# Patient Record
Sex: FEMALE | Race: WHITE | NOT HISPANIC OR LATINO | Employment: FULL TIME | URBAN - METROPOLITAN AREA
[De-identification: names, ages, dates, MRNs, and addresses within clinical notes are randomized per-mention and may not be internally consistent; named-entity substitution may affect disease eponyms.]

---

## 2019-12-11 ENCOUNTER — OFFICE VISIT (OUTPATIENT)
Dept: PSYCHIATRY | Facility: CLINIC | Age: 59
End: 2019-12-11

## 2019-12-11 DIAGNOSIS — F31.31 BIPOLAR AFFECTIVE DISORDER, CURRENTLY DEPRESSED, MILD (HCC): Primary | ICD-10-CM

## 2019-12-11 DIAGNOSIS — F51.05 INSOMNIA DUE TO MENTAL DISORDER: ICD-10-CM

## 2019-12-11 PROBLEM — F31.30 BIPOLAR AFFECTIVE DISORDER, DEPRESSED (HCC): Status: ACTIVE | Noted: 2017-08-03

## 2019-12-11 PROCEDURE — 99213 OFFICE O/P EST LOW 20 MIN: CPT | Performed by: PHYSICIAN ASSISTANT

## 2019-12-11 RX ORDER — SERTRALINE HYDROCHLORIDE 100 MG/1
TABLET, FILM COATED ORAL
Qty: 180 TABLET | Refills: 3 | Status: SHIPPED | OUTPATIENT
Start: 2019-12-11 | End: 2020-12-31 | Stop reason: SDUPTHER

## 2019-12-11 RX ORDER — TRAZODONE HYDROCHLORIDE 150 MG/1
TABLET ORAL EVERY 24 HOURS
COMMUNITY
Start: 2016-12-19 | End: 2019-12-11 | Stop reason: SDUPTHER

## 2019-12-11 RX ORDER — TRAZODONE HYDROCHLORIDE 150 MG/1
150 TABLET ORAL NIGHTLY
Qty: 90 TABLET | Refills: 3 | Status: SHIPPED | OUTPATIENT
Start: 2019-12-11 | End: 2020-12-31 | Stop reason: SDUPTHER

## 2019-12-11 RX ORDER — LEVOTHYROXINE SODIUM 0.05 MG/1
50 TABLET ORAL DAILY
COMMUNITY

## 2019-12-11 RX ORDER — SERTRALINE HYDROCHLORIDE 100 MG/1
TABLET, FILM COATED ORAL
COMMUNITY
Start: 2015-08-12 | End: 2019-12-11 | Stop reason: SDUPTHER
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
## 2020-12-31 ENCOUNTER — OFFICE VISIT (OUTPATIENT)
Dept: PSYCHIATRY | Facility: CLINIC | Age: 60
End: 2020-12-31

## 2020-12-31 DIAGNOSIS — F51.05 INSOMNIA DUE TO MENTAL DISORDER: ICD-10-CM

## 2020-12-31 DIAGNOSIS — F41.9 ANXIETY: ICD-10-CM

## 2020-12-31 DIAGNOSIS — F31.31 BIPOLAR AFFECTIVE DISORDER, CURRENTLY DEPRESSED, MILD (HCC): Primary | ICD-10-CM

## 2020-12-31 PROCEDURE — 99213 OFFICE O/P EST LOW 20 MIN: CPT | Performed by: PHYSICIAN ASSISTANT

## 2020-12-31 RX ORDER — SERTRALINE HYDROCHLORIDE 100 MG/1
TABLET, FILM COATED ORAL
Qty: 180 TABLET | Refills: 3 | Status: SHIPPED | OUTPATIENT
Start: 2020-12-31 | End: 2021-12-16 | Stop reason: SDUPTHER

## 2020-12-31 RX ORDER — TRAZODONE HYDROCHLORIDE 150 MG/1
150 TABLET ORAL NIGHTLY
Qty: 90 TABLET | Refills: 3 | Status: SHIPPED | OUTPATIENT
Start: 2020-12-31 | End: 2021-12-16 | Stop reason: SDUPTHER

## 2020-12-31 NOTE — PATIENT INSTRUCTIONS

## 2020-12-31 NOTE — PROGRESS NOTES
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    PAST PSYCHIATRIC HISTORY  Axis I  Affective/Bipoloar Disorder, Anxiety/Panic Disorder  Axis II  None    PAST OUTPATIENT TREATMENT  Diagnosis treated:  Affective Disorder, Anxiety/Panic Disorder  Treatment Type:  Medication Management  Prior Psychiatric Medications:  Lexapro  Zoloft  Trazodone  Support Groups:  None  Sequelae Of Mental Disorder:  job disruption, social isolation, emotional distress      Interval History  No Change    Side Effects  None    Past Psych Hx was reviewed and compared to 12/11/19 and appropriate updates were made.    Past Medical History:  Past Medical History:   Diagnosis Date   • Anxiety    • Bipolar disorder (CMS/HCC)    • Depression    • Disease of thyroid gland        Social History:  Social History     Socioeconomic History   • Marital status:      Spouse name: Not on file   • Number of children: Not on file   • Years of education: Not on file   • Highest education level: Not on file   Tobacco Use   • Smoking status: Never Smoker   • Smokeless tobacco: Never Used   Substance and Sexual  Activity   • Alcohol use: Yes     Alcohol/week: 1.0 standard drinks     Types: 1 Glasses of wine per week   • Drug use: Never   • Sexual activity: Yes     Partners: Male       Family History:  Family History   Problem Relation Age of Onset   • Anxiety disorder Mother        Past Surgical History:  Past Surgical History:   Procedure Laterality Date   • CHOLECYSTECTOMY OPEN         Problem List:  Patient Active Problem List   Diagnosis   • Bipolar affective disorder, depressed (CMS/HCC)   • Insomnia due to mental disorder       Allergy:   Allergies   Allergen Reactions   • Sulfa Antibiotics Other (See Comments)        Discontinued Medications:  Medications Discontinued During This Encounter   Medication Reason   • sertraline (ZOLOFT) 100 MG tablet Reorder   • traZODone (DESYREL) 150 MG tablet Reorder       Current Medications:   Current Outpatient Medications   Medication Sig Dispense Refill   • levothyroxine (SYNTHROID, LEVOTHROID) 50 MCG tablet Take 50 mcg by mouth Daily.     • sertraline (Zoloft) 100 MG tablet Take two tablets at bedime 180 tablet 3   • traZODone (DESYREL) 150 MG tablet Take 1 tablet by mouth Every Night. 90 tablet 3     No current facility-administered medications for this visit.          Review of Symptoms:    Psychiatric/Behavioral: Negative for agitation, behavioral problems, confusion, decreased concentration, dysphoric mood, hallucinations, self-injury, sleep disturbance and suicidal ideas. The patient is not nervous/anxious and is not hyperactive.        Physical Exam:   There were no vitals taken for this visit.    Mental Status Exam:   Hygiene:   good  Cooperation:  Cooperative  Eye Contact:  Good  Psychomotor Behavior:  Appropriate  Affect:  Appropriate  Mood: Normal  Hopelessness: Denies  Speech:  Normal  Thought Process:  Goal directed  Thought Content:  Normal  Suicidal:  None  Homicidal:  None  Hallucinations:  None  Delusion:  None  Memory:  Intact  Orientation:  Person, Place, Time  and Situation  Reliability:  good  Insight:  Good  Judgement:  Good  Impulse Control:  Good  Physical/Medical Issues:  No      Mental Status Exam was reviewed and compared to 12/11/19 visit appropriate updates were made.    PHQ-9 Depression Screening  Little interest or pleasure in doing things? 0   Feeling down, depressed, or hopeless? 1   Trouble falling or staying asleep, or sleeping too much?     Feeling tired or having little energy?     Poor appetite or overeating?     Feeling bad about yourself - or that you are a failure or have let yourself or your family down?     Trouble concentrating on things, such as reading the newspaper or watching television?     Moving or speaking so slowly that other people could have noticed? Or the opposite - being so fidgety or restless that you have been moving around a lot more than usual?     Thoughts that you would be better off dead, or of hurting yourself in some way?     PHQ-9 Total Score 1   If you checked off any problems, how difficult have these problems made it for you to do your work, take care of things at home, or get along with other people?             Never smoker    I advised Merced of the risks of tobacco use.     Lab Results:   No visits with results within 3 Month(s) from this visit.   Latest known visit with results is:   No results found for any previous visit.       Assessment/Plan   Problems Addressed this Visit        Other    Bipolar affective disorder, depressed (CMS/HCC) - Primary    Relevant Medications    sertraline (Zoloft) 100 MG tablet    traZODone (DESYREL) 150 MG tablet    Insomnia due to mental disorder    Relevant Medications    sertraline (Zoloft) 100 MG tablet    traZODone (DESYREL) 150 MG tablet      Other Visit Diagnoses     Anxiety          Diagnoses       Codes Comments    Bipolar affective disorder, currently depressed, mild (CMS/HCC)    -  Primary ICD-10-CM: F31.31  ICD-9-CM: 296.51     Insomnia due to mental disorder     ICD-10-CM:  F51.05  ICD-9-CM: 300.9, 327.02     Anxiety     ICD-10-CM: F41.9  ICD-9-CM: 300.00           Visit Diagnoses:    ICD-10-CM ICD-9-CM   1. Bipolar affective disorder, currently depressed, mild (CMS/HCC)  F31.31 296.51   2. Insomnia due to mental disorder  F51.05 300.9     327.02   3. Anxiety  F41.9 300.00       TREATMENT PLAN/GOALS: Continue supportive psychotherapy efforts and medications as indicated. Treatment and medication options discussed during today's visit. Patient ackowledged and verbally consented to continue with current treatment plan and was educated on the importance of compliance with treatment and follow-up appointments.    MEDICATION ISSUES:  INSPECT reviewed as expected  Discussed medication options and treatment plan of prescribed medication as well as the risks, benefits, and side effects including potential falls, possible impaired driving and metabolic adversities among others. Patient is agreeable to call the office with any worsening of symptoms or onset of side effects. Patient is agreeable to call 911 or go to the nearest ER should he/she begin having SI/HI. No medication side effects or related complaints today.     Patient has been doing well, adjusts her zoloft dosage based on her mood and situation, most of the time takes 150mg daily  Continue Zoloft and trazodone at current dosages with no changes and refills given    MEDS ORDERED DURING VISIT:  New Medications Ordered This Visit   Medications   • sertraline (Zoloft) 100 MG tablet     Sig: Take two tablets at bedime     Dispense:  180 tablet     Refill:  3   • traZODone (DESYREL) 150 MG tablet     Sig: Take 1 tablet by mouth Every Night.     Dispense:  90 tablet     Refill:  3       Return in about 1 year (around 12/31/2021).         This document has been electronically signed by Krystin Linda PA-C  December 31, 2020 11:48 EST

## 2021-12-16 ENCOUNTER — OFFICE VISIT (OUTPATIENT)
Dept: PSYCHIATRY | Facility: CLINIC | Age: 61
End: 2021-12-16

## 2021-12-16 DIAGNOSIS — F51.05 INSOMNIA DUE TO MENTAL DISORDER: Chronic | ICD-10-CM

## 2021-12-16 DIAGNOSIS — F31.31 BIPOLAR AFFECTIVE DISORDER, CURRENTLY DEPRESSED, MILD (HCC): Chronic | ICD-10-CM

## 2021-12-16 DIAGNOSIS — F41.9 ANXIETY: Primary | ICD-10-CM

## 2021-12-16 PROCEDURE — 99214 OFFICE O/P EST MOD 30 MIN: CPT | Performed by: PHYSICIAN ASSISTANT

## 2021-12-16 RX ORDER — NALTREXONE HYDROCHLORIDE AND BUPROPION HYDROCHLORIDE 8; 90 MG/1; MG/1
TABLET, EXTENDED RELEASE ORAL
COMMUNITY
Start: 2021-11-16 | End: 2021-12-16

## 2021-12-16 RX ORDER — TRAZODONE HYDROCHLORIDE 150 MG/1
150 TABLET ORAL NIGHTLY
Qty: 90 TABLET | Refills: 3 | Status: SHIPPED | OUTPATIENT
Start: 2021-12-16 | End: 2023-01-17 | Stop reason: SDUPTHER

## 2021-12-16 RX ORDER — SERTRALINE HYDROCHLORIDE 100 MG/1
TABLET, FILM COATED ORAL
Qty: 180 TABLET | Refills: 3 | Status: SHIPPED | OUTPATIENT
Start: 2021-12-16 | End: 2023-01-17 | Stop reason: SDUPTHER
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
## 2023-01-15 NOTE — PROGRESS NOTES
"Subjective   Merced Arevalo is a 62 y.o. female who presents today for follow-up for psychiatric medication management. Patient is new to this provider, but previously saw SHAI Edwards.     Chief Complaint:  \"I'm doing good.\" Patient just needs medication refills.     History of Present Illness:     Patient last saw AMADEO Linda on 12/16/21. At that time, she was taking 200mg sertraline (uses 100mg and takes 1-2 depending on her mood) and trazodone 150mg at night.     At today's visit, she states \"I'm doing well.\" States these medications are working well for her.   No suicidal thoughts. No HI/AVH.   No anxiety.   Sleeping well on the trazodone. She does take it every night.        Past medical history: anxiety, bipolar disorder, depression,   Past psychiatric history: anxiety, bipolar disorder, depression  Family history: anxiety--mother  Social history: Patient is not a smoker, she reports one glass of wine per week, and reports no drug use.     Ability and capacity to respond to treatment: Excellent    Functional status: Good  Prognosis: Excellent  Long term goals: Continue on current medications and maintain current quality of life.   Short term goals: She has been embroidering more which helps her.   Strengths: Great insight into condition. Very compliant with medications.   Weaknesses: Patient prefers yearly appointments which does put her at higher risk to deteriorate between appointments, but overall she is doing great. She is agreeable to calling if she has any issues before her next appointment.     Patient presents with symptoms and behaviors that are consistent with the following DSM-5 diagnoses:  1. Bipolar disorder  2. Insomnia    The following portions of the patient's history were reviewed and updated as appropriate: allergies, current medications, past family history, past medical history, past social history, past surgical history and problem list.    PAST OUTPATIENT TREATMENT  Diagnosis " treated:  Affective Disorder, Anxiety/Panic Disorder  Treatment Type:  Lexapro  Zoloft  Trazodone  Prior Psychiatric Medications:  Lexapro  Zoloft  Trazodone  Support Groups:  None  Sequelae Of Mental Disorder:  emotional distress    Interval History  Improved    Side Effects  None      Past Medical History:  Past Medical History:   Diagnosis Date   • Anxiety    • Bipolar disorder (HCC)    • Depression    • Disease of thyroid gland        Social History:  Social History     Socioeconomic History   • Marital status:    Tobacco Use   • Smoking status: Never   • Smokeless tobacco: Never   Vaping Use   • Vaping Use: Never used   Substance and Sexual Activity   • Alcohol use: Yes     Alcohol/week: 1.0 standard drink     Types: 1 Glasses of wine per week   • Drug use: Never   • Sexual activity: Yes     Partners: Male       Family History:  Family History   Problem Relation Age of Onset   • Anxiety disorder Mother        Past Surgical History:  Past Surgical History:   Procedure Laterality Date   • CHOLECYSTECTOMY OPEN         Problem List:  Patient Active Problem List   Diagnosis   • Bipolar affective disorder, depressed (HCC)   • Insomnia due to mental disorder   • Anxiety       Allergy:   Allergies   Allergen Reactions   • Sulfa Antibiotics Other (See Comments)        Discontinued Medications:  Medications Discontinued During This Encounter   Medication Reason   • sertraline (Zoloft) 100 MG tablet Reorder   • traZODone (DESYREL) 150 MG tablet Reorder       Current Medications:   Current Outpatient Medications   Medication Sig Dispense Refill   • levothyroxine (SYNTHROID, LEVOTHROID) 50 MCG tablet Take 50 mcg by mouth Daily.     • sertraline (Zoloft) 100 MG tablet Take two tablets at bedime 180 tablet 3   • traZODone (DESYREL) 150 MG tablet Take 1 tablet by mouth Every Night. 90 tablet 3     No current facility-administered medications for this visit.         Psychological ROS: positive for - depression and sleep  disturbances      Physical Exam:   Blood pressure 142/92, pulse 71, weight 71.1 kg (156 lb 12.8 oz), SpO2 98 %.    Mental Status Exam:   Hygiene:   good  Cooperation:  Cooperative  Eye Contact:  Good  Psychomotor Behavior:  Appropriate  Affect:  Appropriate  Mood: euthymic  Hopelessness: Denies  Speech:  Normal  Thought Process:  Goal directed and Linear  Thought Content:  Normal  Suicidal:  None  Homicidal:  None  Hallucinations:  None  Delusion:  None  Memory:  Intact  Orientation:  Person, Place, Time and Situation  Reliability:  good  Insight:  Good  Judgement:  Good  Impulse Control:  Good  Physical/Medical Issues:  No        PHQ-9 Depression Screening    Little interest or pleasure in doing things? 0-->not at all   Feeling down, depressed, or hopeless? 0-->not at all   Trouble falling or staying asleep, or sleeping too much?     Feeling tired or having little energy?     Poor appetite or overeating?     Feeling bad about yourself - or that you are a failure or have let yourself or your family down?     Trouble concentrating on things, such as reading the newspaper or watching television?     Moving or speaking so slowly that other people could have noticed? Or the opposite - being so fidgety or restless that you have been moving around a lot more than usual?     Thoughts that you would be better off dead, or of hurting yourself in some way?     PHQ-9 Total Score 0   If you checked off any problems, how difficult have these problems made it for you to do your work, take care of things at home, or get along with other people?          Never smoker    I advised Merced of the risks of tobacco use.     Result Review:    Labs:  No visits with results within 3 Month(s) from this visit.   Latest known visit with results is:   No results found for any previous visit.       Assessment & Plan   Diagnoses and all orders for this visit:    1. Bipolar affective disorder, currently depressed, mild (HCC)  -     sertraline  (Zoloft) 100 MG tablet; Take two tablets at bedime  Dispense: 180 tablet; Refill: 3  -     traZODone (DESYREL) 150 MG tablet; Take 1 tablet by mouth Every Night.  Dispense: 90 tablet; Refill: 3    2. Insomnia due to mental disorder  -     traZODone (DESYREL) 150 MG tablet; Take 1 tablet by mouth Every Night.  Dispense: 90 tablet; Refill: 3    Continue sertraline 100mg, two tablets daily.   Continue trazodone 150mg at night.     Visit Diagnoses:    ICD-10-CM ICD-9-CM   1. Bipolar affective disorder, currently depressed, mild (HCC)  F31.31 296.51   2. Insomnia due to mental disorder  F51.05 300.9     327.02       TREATMENT PLAN/GOALS: Continue supportive psychotherapy efforts and medications as indicated. Treatment and medication options discussed during today's visit. Patient ackowledged and verbally consented to continue with current treatment plan and was educated on the importance of compliance with treatment and follow-up appointments.    MEDICATION ISSUES:  INSPECT reviewed as expected    Discussed medication options and treatment plan of prescribed medication as well as the risks, benefits, and side effects including potential falls, possible impaired driving and metabolic adversities among others. Patient is agreeable to call the office with any worsening of symptoms or onset of side effects. Patient is agreeable to call 911 or go to the nearest ER should he/she begin having SI/HI. No medication side effects or related complaints today.     MEDS ORDERED DURING VISIT:  New Medications Ordered This Visit   Medications   • sertraline (Zoloft) 100 MG tablet     Sig: Take two tablets at bedime     Dispense:  180 tablet     Refill:  3   • traZODone (DESYREL) 150 MG tablet     Sig: Take 1 tablet by mouth Every Night.     Dispense:  90 tablet     Refill:  3       Return in about 1 year (around 1/17/2024).         This document has been electronically signed by GONZALO Guido  January 17, 2023 13:50  EST    Part of this note may be an electronic transcription/translation of spoken language to printed text using the Dragon Dictation System.    Answers for HPI/ROS submitted by the patient on 1/10/2023  Please describe your symptoms.: Psych visit  Have you had these symptoms before?: Yes  How long have you been having these symptoms?: Greater than 2 weeks  Please list any medications you are currently taking for this condition.: Trazadone 150mg, Sertralene 100-200mg  Please describe any probable cause for these symptoms. : Depression/Anxiety, Sleeplessness  What is the primary reason for your visit?: Other

## 2023-01-17 ENCOUNTER — OFFICE VISIT (OUTPATIENT)
Dept: PSYCHIATRY | Facility: CLINIC | Age: 63
End: 2023-01-17

## 2023-01-17 VITALS
SYSTOLIC BLOOD PRESSURE: 142 MMHG | WEIGHT: 156.8 LBS | OXYGEN SATURATION: 98 % | HEART RATE: 71 BPM | DIASTOLIC BLOOD PRESSURE: 92 MMHG

## 2023-01-17 DIAGNOSIS — F51.05 INSOMNIA DUE TO MENTAL DISORDER: Chronic | ICD-10-CM

## 2023-01-17 DIAGNOSIS — F31.31 BIPOLAR AFFECTIVE DISORDER, CURRENTLY DEPRESSED, MILD: Chronic | ICD-10-CM

## 2023-01-17 PROCEDURE — 90792 PSYCH DIAG EVAL W/MED SRVCS: CPT

## 2023-01-17 RX ORDER — SERTRALINE HYDROCHLORIDE 100 MG/1
TABLET, FILM COATED ORAL
Qty: 180 TABLET | Refills: 3 | Status: SHIPPED | OUTPATIENT
Start: 2023-01-17

## 2023-01-17 RX ORDER — TRAZODONE HYDROCHLORIDE 150 MG/1
150 TABLET ORAL NIGHTLY
Qty: 90 TABLET | Refills: 3 | Status: SHIPPED | OUTPATIENT
Start: 2023-01-17

## 2024-01-17 ENCOUNTER — OFFICE VISIT (OUTPATIENT)
Dept: PSYCHIATRY | Facility: CLINIC | Age: 64
End: 2024-01-17

## 2024-01-17 VITALS — DIASTOLIC BLOOD PRESSURE: 84 MMHG | HEART RATE: 94 BPM | OXYGEN SATURATION: 98 % | SYSTOLIC BLOOD PRESSURE: 150 MMHG

## 2024-01-17 DIAGNOSIS — F51.05 INSOMNIA DUE TO MENTAL DISORDER: Chronic | ICD-10-CM

## 2024-01-17 DIAGNOSIS — F31.31 BIPOLAR AFFECTIVE DISORDER, CURRENTLY DEPRESSED, MILD: Primary | Chronic | ICD-10-CM

## 2024-01-17 RX ORDER — LORATADINE 10 MG/1
TABLET ORAL EVERY 24 HOURS
COMMUNITY

## 2024-01-17 RX ORDER — SERTRALINE HYDROCHLORIDE 100 MG/1
TABLET, FILM COATED ORAL
Qty: 180 TABLET | Refills: 3 | Status: SHIPPED | OUTPATIENT
Start: 2024-01-17

## 2024-01-17 RX ORDER — TRAZODONE HYDROCHLORIDE 150 MG/1
150 TABLET ORAL NIGHTLY
Qty: 90 TABLET | Refills: 3 | Status: SHIPPED | OUTPATIENT
Start: 2024-01-17

## 2024-01-17 RX ORDER — IBUPROFEN 200 MG
TABLET ORAL EVERY 6 HOURS SCHEDULED
COMMUNITY

## 2024-01-17 NOTE — PROGRESS NOTES
Subjective   Merced Arevalo is a 63 y.o. female who presents today for follow-up for psychiatric medication management.     Chief Complaint:  Bipolar disorder, insomnia     History of Present Illness:     Patient is here for her yearly follow up for psychiatric medications.   She states mood is good. She says nothing new going on. Reports some family issues but she tries to stay mostly to her self.   She states sleep is good on the trazodone.   The holidays were good for her.   Overall, states she is doing well on current medications.   Denies any suicidal thoughts.     Patient presents with symptoms and behaviors that are consistent with the following DSM-5 diagnoses:  1. Bipolar disorder  2. Insomnia    The following portions of the patient's history were reviewed and updated as appropriate: allergies, current medications, past family history, past medical history, past social history, past surgical history and problem list.    PAST OUTPATIENT TREATMENT  Diagnosis treated:  Affective Disorder, Anxiety/Panic Disorder  Treatment Type:  Lexapro  Zoloft  Trazodone  Prior Psychiatric Medications:  Lexapro  Zoloft  Trazodone  Support Groups:  None  Sequelae Of Mental Disorder:  emotional distress    Interval History  Improved    Side Effects  None      Past Medical History:  Past Medical History:   Diagnosis Date    Anxiety     Bipolar disorder     Depression     Disease of thyroid gland        Social History:  Social History     Socioeconomic History    Marital status:    Tobacco Use    Smoking status: Never    Smokeless tobacco: Never   Vaping Use    Vaping Use: Never used   Substance and Sexual Activity    Alcohol use: Yes     Alcohol/week: 1.0 standard drink of alcohol     Types: 1 Glasses of wine per week    Drug use: Never    Sexual activity: Yes     Partners: Male       Family History:  Family History   Problem Relation Age of Onset    Anxiety disorder Mother        Past Surgical History:  Past Surgical  History:   Procedure Laterality Date    CHOLECYSTECTOMY OPEN         Problem List:  Patient Active Problem List   Diagnosis    Bipolar affective disorder, depressed    Insomnia due to mental disorder    Anxiety       Allergy:   Allergies   Allergen Reactions    Sulfa Antibiotics Other (See Comments)        Discontinued Medications:  Medications Discontinued During This Encounter   Medication Reason    sertraline (Zoloft) 100 MG tablet Reorder    traZODone (DESYREL) 150 MG tablet Reorder         Current Medications:   Current Outpatient Medications   Medication Sig Dispense Refill    ibuprofen (ADVIL,MOTRIN) 200 MG tablet Every 6 (Six) Hours.      levothyroxine (SYNTHROID, LEVOTHROID) 50 MCG tablet Take 1 tablet by mouth Daily.      loratadine (Claritin) 10 MG tablet Daily.      sertraline (Zoloft) 100 MG tablet Take two tablets at bedime 180 tablet 3    traZODone (DESYREL) 150 MG tablet Take 1 tablet by mouth Every Night. 90 tablet 3     No current facility-administered medications for this visit.         Psychological ROS: positive for - depression and sleep disturbances      Physical Exam:   Blood pressure 150/84, pulse 94, SpO2 98%.    Mental Status Exam:   Hygiene:   good  Cooperation:  Cooperative  Eye Contact:  Good  Psychomotor Behavior:  Appropriate  Affect:  Appropriate  Mood: euthymic  Hopelessness: Denies  Speech:  Normal  Thought Process:  Goal directed and Linear  Thought Content:  Normal  Suicidal:  None  Homicidal:  None  Hallucinations:  None  Delusion:  None  Memory:  Intact  Orientation:  Person, Place, Time and Situation  Reliability:  good  Insight:  Good  Judgement:  Good  Impulse Control:  Good  Physical/Medical Issues:  No      Mental status exam was reviewed and compared to visit on 1/17/23 and appropriate updates were made.     PHQ-9 Depression Screening    Little interest or pleasure in doing things? 0-->not at all   Feeling down, depressed, or hopeless? 0-->not at all   Trouble falling or  staying asleep, or sleeping too much? 0-->not at all   Feeling tired or having little energy? 0-->not at all   Poor appetite or overeating? 0-->not at all   Feeling bad about yourself - or that you are a failure or have let yourself or your family down? 0-->not at all   Trouble concentrating on things, such as reading the newspaper or watching television? 0-->not at all   Moving or speaking so slowly that other people could have noticed? Or the opposite - being so fidgety or restless that you have been moving around a lot more than usual? 0-->not at all   Thoughts that you would be better off dead, or of hurting yourself in some way? 0-->not at all   PHQ-9 Total Score 0   If you checked off any problems, how difficult have these problems made it for you to do your work, take care of things at home, or get along with other people? not difficult at all        Never smoker    I advised Merced of the risks of tobacco use.     Result Review:    Labs:  No visits with results within 3 Month(s) from this visit.   Latest known visit with results is:   No results found for any previous visit.       Assessment & Plan   Diagnoses and all orders for this visit:    1. Bipolar affective disorder, currently depressed, mild (Primary)  -     sertraline (Zoloft) 100 MG tablet; Take two tablets at bedime  Dispense: 180 tablet; Refill: 3  -     traZODone (DESYREL) 150 MG tablet; Take 1 tablet by mouth Every Night.  Dispense: 90 tablet; Refill: 3    2. Insomnia due to mental disorder  -     traZODone (DESYREL) 150 MG tablet; Take 1 tablet by mouth Every Night.  Dispense: 90 tablet; Refill: 3      Continue sertraline 100mg, two tablets daily.   Continue trazodone 150mg at night.     Visit Diagnoses:    ICD-10-CM ICD-9-CM   1. Bipolar affective disorder, currently depressed, mild  F31.31 296.51   2. Insomnia due to mental disorder  F51.05 300.9     327.02         TREATMENT PLAN/GOALS: Continue supportive psychotherapy efforts and  medications as indicated. Treatment and medication options discussed during today's visit. Patient ackowledged and verbally consented to continue with current treatment plan and was educated on the importance of compliance with treatment and follow-up appointments.    MEDICATION ISSUES:  INSPECT reviewed as expected    Discussed medication options and treatment plan of prescribed medication as well as the risks, benefits, and side effects including potential falls, possible impaired driving and metabolic adversities among others. Patient is agreeable to call the office with any worsening of symptoms or onset of side effects. Patient is agreeable to call 911 or go to the nearest ER should he/she begin having SI/HI. No medication side effects or related complaints today.     MEDS ORDERED DURING VISIT:  New Medications Ordered This Visit   Medications    sertraline (Zoloft) 100 MG tablet     Sig: Take two tablets at bedime     Dispense:  180 tablet     Refill:  3    traZODone (DESYREL) 150 MG tablet     Sig: Take 1 tablet by mouth Every Night.     Dispense:  90 tablet     Refill:  3       Return in about 1 year (around 1/17/2025).         This document has been electronically signed by GONZAOL Guido  January 17, 2024 13:51 EST    Part of this note may be an electronic transcription/translation of spoken language to printed text using the Dragon Dictation System.    Answers for HPI/ROS submitted by the patient on 1/10/2023  Please describe your symptoms.: Psych visit  Have you had these symptoms before?: Yes  How long have you been having these symptoms?: Greater than 2 weeks  Please list any medications you are currently taking for this condition.: Trazadone 150mg, Sertralene 100-200mg  Please describe any probable cause for these symptoms. : Depression/Anxiety, Sleeplessness  What is the primary reason for your visit?: Other

## 2024-01-18 DIAGNOSIS — F31.31 BIPOLAR AFFECTIVE DISORDER, CURRENTLY DEPRESSED, MILD: Chronic | ICD-10-CM

## 2024-01-18 RX ORDER — SERTRALINE HYDROCHLORIDE 100 MG/1
TABLET, FILM COATED ORAL
Qty: 180 TABLET | Refills: 3 | OUTPATIENT
Start: 2024-01-18

## 2025-01-09 DIAGNOSIS — F31.31 BIPOLAR AFFECTIVE DISORDER, CURRENTLY DEPRESSED, MILD: Chronic | ICD-10-CM

## 2025-01-09 RX ORDER — SERTRALINE HYDROCHLORIDE 100 MG/1
TABLET, FILM COATED ORAL
Qty: 180 TABLET | Refills: 3 | Status: SHIPPED | OUTPATIENT
Start: 2025-01-09

## 2025-01-09 NOTE — TELEPHONE ENCOUNTER
Rx Refill Note  Requested Prescriptions     Pending Prescriptions Disp Refills    sertraline (ZOLOFT) 100 MG tablet [Pharmacy Med Name: SERTRALINE  MG TABLET] 180 tablet 3     Sig: TAKE TWO TABLETS BY MOUTH EVERY NIGHT AT BEDTIME        Last office visit with prescribing clinician: 1/17/2024     Next office visit with prescribing clinician: 1/15/2025     Office Visit with Kendal Resendiz APRN (01/17/2024)     Laurence French  01/09/25, 08:20 EST

## 2025-01-15 ENCOUNTER — OFFICE VISIT (OUTPATIENT)
Dept: PSYCHIATRY | Facility: CLINIC | Age: 65
End: 2025-01-15
Payer: MEDICAID

## 2025-01-15 DIAGNOSIS — F31.31 BIPOLAR AFFECTIVE DISORDER, CURRENTLY DEPRESSED, MILD: Primary | Chronic | ICD-10-CM

## 2025-01-15 DIAGNOSIS — F51.05 INSOMNIA DUE TO MENTAL DISORDER: Chronic | ICD-10-CM

## 2025-01-15 RX ORDER — TRAZODONE HYDROCHLORIDE 150 MG/1
150 TABLET ORAL NIGHTLY
Qty: 90 TABLET | Refills: 3 | Status: SHIPPED | OUTPATIENT
Start: 2025-01-15
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
## 2025-02-26 ENCOUNTER — TRANSCRIBE ORDERS (OUTPATIENT)
Dept: LAB | Facility: HOSPITAL | Age: 65
End: 2025-02-26
Payer: MEDICARE

## 2025-02-26 ENCOUNTER — LAB (OUTPATIENT)
Dept: LAB | Facility: HOSPITAL | Age: 65
End: 2025-02-26
Payer: MEDICARE

## 2025-02-26 ENCOUNTER — HOSPITAL ENCOUNTER (OUTPATIENT)
Dept: CARDIOLOGY | Facility: HOSPITAL | Age: 65
Discharge: HOME | End: 2025-02-26
Payer: MEDICARE

## 2025-02-26 DIAGNOSIS — Z01.818 PRE-OP TESTING: ICD-10-CM

## 2025-02-26 DIAGNOSIS — Z01.818 PRE-OP TESTING: Primary | ICD-10-CM

## 2025-02-26 LAB
ALBUMIN SERPL-MCNC: 4.2 G/DL (ref 3.5–5.2)
ANION GAP SERPL CALCULATED.3IONS-SCNC: 11 MMOL/L (ref 5–15)
BASOPHILS # BLD AUTO: 0.06 10*3/MM3 (ref 0–0.2)
BASOPHILS NFR BLD AUTO: 1.3 % (ref 0–1.5)
BUN SERPL-MCNC: 13 MG/DL (ref 8–23)
BUN/CREAT SERPL: 17.8 (ref 7–25)
CALCIUM SPEC-SCNC: 9 MG/DL (ref 8.6–10.5)
CHLORIDE SERPL-SCNC: 104 MMOL/L (ref 98–107)
CO2 SERPL-SCNC: 24 MMOL/L (ref 22–29)
CREAT SERPL-MCNC: 0.73 MG/DL (ref 0.57–1)
DEPRECATED RDW RBC AUTO: 46 FL (ref 37–54)
EGFRCR SERPLBLD CKD-EPI 2021: 91.4 ML/MIN/1.73
EOSINOPHIL # BLD AUTO: 0.18 10*3/MM3 (ref 0–0.4)
EOSINOPHIL NFR BLD AUTO: 3.8 % (ref 0.3–6.2)
ERYTHROCYTE [DISTWIDTH] IN BLOOD BY AUTOMATED COUNT: 12.5 % (ref 12.3–15.4)
GLUCOSE SERPL-MCNC: 80 MG/DL (ref 65–99)
HBA1C MFR BLD: 5.2 % (ref 4.8–5.6)
HCT VFR BLD AUTO: 38 % (ref 34–46.6)
HGB BLD-MCNC: 12.2 G/DL (ref 12–15.9)
IMM GRANULOCYTES # BLD AUTO: 0.02 10*3/MM3 (ref 0–0.05)
IMM GRANULOCYTES NFR BLD AUTO: 0.4 % (ref 0–0.5)
LYMPHOCYTES # BLD AUTO: 1.35 10*3/MM3 (ref 0.7–3.1)
LYMPHOCYTES NFR BLD AUTO: 28.1 % (ref 19.6–45.3)
MCH RBC QN AUTO: 32.1 PG (ref 26.6–33)
MCHC RBC AUTO-ENTMCNC: 32.1 G/DL (ref 31.5–35.7)
MCV RBC AUTO: 100 FL (ref 79–97)
MONOCYTES # BLD AUTO: 0.53 10*3/MM3 (ref 0.1–0.9)
MONOCYTES NFR BLD AUTO: 11 % (ref 5–12)
MRSA DNA SPEC QL NAA+PROBE: NORMAL
NEUTROPHILS NFR BLD AUTO: 2.66 10*3/MM3 (ref 1.7–7)
NEUTROPHILS NFR BLD AUTO: 55.4 % (ref 42.7–76)
NRBC BLD AUTO-RTO: 0 /100 WBC (ref 0–0.2)
PLATELET # BLD AUTO: 252 10*3/MM3 (ref 140–450)
PMV BLD AUTO: 9.3 FL (ref 6–12)
POTASSIUM SERPL-SCNC: 4.7 MMOL/L (ref 3.5–5.2)
QT INTERVAL: 362 MS
QTC INTERVAL: 479 MS
RBC # BLD AUTO: 3.8 10*6/MM3 (ref 3.77–5.28)
SODIUM SERPL-SCNC: 139 MMOL/L (ref 136–145)
WBC NRBC COR # BLD AUTO: 4.8 10*3/MM3 (ref 3.4–10.8)

## 2025-02-26 PROCEDURE — 87641 MR-STAPH DNA AMP PROBE: CPT

## 2025-02-26 PROCEDURE — 80048 BASIC METABOLIC PNL TOTAL CA: CPT

## 2025-02-26 PROCEDURE — 36415 COLL VENOUS BLD VENIPUNCTURE: CPT

## 2025-02-26 PROCEDURE — 85025 COMPLETE CBC W/AUTO DIFF WBC: CPT

## 2025-02-26 PROCEDURE — 93005 ELECTROCARDIOGRAM TRACING: CPT | Performed by: ORTHOPAEDIC SURGERY

## 2025-02-26 PROCEDURE — 83036 HEMOGLOBIN GLYCOSYLATED A1C: CPT

## 2025-02-26 PROCEDURE — 82040 ASSAY OF SERUM ALBUMIN: CPT

## 2025-03-07 LAB
QT INTERVAL: 362 MS
QTC INTERVAL: 479 MS

## 2025-04-24 ENCOUNTER — TRANSCRIBE ORDERS (OUTPATIENT)
Dept: ADMINISTRATIVE | Facility: HOSPITAL | Age: 65
End: 2025-04-24
Payer: MEDICARE

## 2025-04-24 ENCOUNTER — LAB (OUTPATIENT)
Dept: LAB | Facility: HOSPITAL | Age: 65
End: 2025-04-24
Payer: MEDICARE

## 2025-04-24 DIAGNOSIS — R73.09 IMPAIRED GLUCOSE TOLERANCE TEST: ICD-10-CM

## 2025-04-24 DIAGNOSIS — Z01.818 OTHER SPECIFIED PRE-OPERATIVE EXAMINATION: ICD-10-CM

## 2025-04-24 DIAGNOSIS — Z01.818 OTHER SPECIFIED PRE-OPERATIVE EXAMINATION: Primary | ICD-10-CM

## 2025-04-24 LAB
ALBUMIN SERPL-MCNC: 4.2 G/DL (ref 3.5–5.2)
ANION GAP SERPL CALCULATED.3IONS-SCNC: 11.5 MMOL/L (ref 5–15)
BASOPHILS # BLD AUTO: 0.04 10*3/MM3 (ref 0–0.2)
BASOPHILS NFR BLD AUTO: 0.8 % (ref 0–1.5)
BUN SERPL-MCNC: 19 MG/DL (ref 8–23)
BUN/CREAT SERPL: 22.4 (ref 7–25)
CALCIUM SPEC-SCNC: 9.2 MG/DL (ref 8.6–10.5)
CHLORIDE SERPL-SCNC: 102 MMOL/L (ref 98–107)
CO2 SERPL-SCNC: 25.5 MMOL/L (ref 22–29)
CREAT SERPL-MCNC: 0.85 MG/DL (ref 0.57–1)
DEPRECATED RDW RBC AUTO: 47.8 FL (ref 37–54)
EGFRCR SERPLBLD CKD-EPI 2021: 76.1 ML/MIN/1.73
EOSINOPHIL # BLD AUTO: 0.34 10*3/MM3 (ref 0–0.4)
EOSINOPHIL NFR BLD AUTO: 6.4 % (ref 0.3–6.2)
ERYTHROCYTE [DISTWIDTH] IN BLOOD BY AUTOMATED COUNT: 13.2 % (ref 12.3–15.4)
GLUCOSE SERPL-MCNC: 92 MG/DL (ref 65–99)
HBA1C MFR BLD: 4.92 % (ref 4.8–5.6)
HCT VFR BLD AUTO: 38.3 % (ref 34–46.6)
HGB BLD-MCNC: 12.1 G/DL (ref 12–15.9)
IMM GRANULOCYTES # BLD AUTO: 0.02 10*3/MM3 (ref 0–0.05)
IMM GRANULOCYTES NFR BLD AUTO: 0.4 % (ref 0–0.5)
LYMPHOCYTES # BLD AUTO: 1.33 10*3/MM3 (ref 0.7–3.1)
LYMPHOCYTES NFR BLD AUTO: 25 % (ref 19.6–45.3)
MCH RBC QN AUTO: 31.3 PG (ref 26.6–33)
MCHC RBC AUTO-ENTMCNC: 31.6 G/DL (ref 31.5–35.7)
MCV RBC AUTO: 99 FL (ref 79–97)
MONOCYTES # BLD AUTO: 0.47 10*3/MM3 (ref 0.1–0.9)
MONOCYTES NFR BLD AUTO: 8.8 % (ref 5–12)
MRSA DNA SPEC QL NAA+PROBE: NORMAL
NEUTROPHILS NFR BLD AUTO: 3.12 10*3/MM3 (ref 1.7–7)
NEUTROPHILS NFR BLD AUTO: 58.6 % (ref 42.7–76)
NRBC BLD AUTO-RTO: 0 /100 WBC (ref 0–0.2)
PLATELET # BLD AUTO: 216 10*3/MM3 (ref 140–450)
PMV BLD AUTO: 9.6 FL (ref 6–12)
POTASSIUM SERPL-SCNC: 4.3 MMOL/L (ref 3.5–5.2)
RBC # BLD AUTO: 3.87 10*6/MM3 (ref 3.77–5.28)
SODIUM SERPL-SCNC: 139 MMOL/L (ref 136–145)
WBC NRBC COR # BLD AUTO: 5.32 10*3/MM3 (ref 3.4–10.8)

## 2025-04-24 PROCEDURE — 87641 MR-STAPH DNA AMP PROBE: CPT

## 2025-04-24 PROCEDURE — 36415 COLL VENOUS BLD VENIPUNCTURE: CPT

## 2025-04-24 PROCEDURE — 83036 HEMOGLOBIN GLYCOSYLATED A1C: CPT

## 2025-04-24 PROCEDURE — 80048 BASIC METABOLIC PNL TOTAL CA: CPT

## 2025-04-24 PROCEDURE — 82040 ASSAY OF SERUM ALBUMIN: CPT

## 2025-04-24 PROCEDURE — 85025 COMPLETE CBC W/AUTO DIFF WBC: CPT
